# Patient Record
Sex: FEMALE | Race: BLACK OR AFRICAN AMERICAN | NOT HISPANIC OR LATINO
[De-identification: names, ages, dates, MRNs, and addresses within clinical notes are randomized per-mention and may not be internally consistent; named-entity substitution may affect disease eponyms.]

---

## 2023-08-14 ENCOUNTER — NON-APPOINTMENT (OUTPATIENT)
Age: 28
End: 2023-08-14

## 2023-08-14 ENCOUNTER — APPOINTMENT (OUTPATIENT)
Dept: OBGYN | Facility: CLINIC | Age: 28
End: 2023-08-14
Payer: COMMERCIAL

## 2023-08-14 VITALS
BODY MASS INDEX: 26.06 KG/M2 | WEIGHT: 138 LBS | SYSTOLIC BLOOD PRESSURE: 110 MMHG | DIASTOLIC BLOOD PRESSURE: 62 MMHG | HEIGHT: 61 IN | HEART RATE: 59 BPM

## 2023-08-14 DIAGNOSIS — Z01.419 ENCOUNTER FOR GYNECOLOGICAL EXAMINATION (GENERAL) (ROUTINE) W/OUT ABNORMAL FINDINGS: ICD-10-CM

## 2023-08-14 DIAGNOSIS — Z78.9 OTHER SPECIFIED HEALTH STATUS: ICD-10-CM

## 2023-08-14 DIAGNOSIS — N89.8 OTHER SPECIFIED NONINFLAMMATORY DISORDERS OF VAGINA: ICD-10-CM

## 2023-08-14 DIAGNOSIS — N85.4 MALPOSITION OF UTERUS: ICD-10-CM

## 2023-08-14 PROBLEM — Z00.00 ENCOUNTER FOR PREVENTIVE HEALTH EXAMINATION: Status: ACTIVE | Noted: 2023-08-14

## 2023-08-14 PROCEDURE — 76830 TRANSVAGINAL US NON-OB: CPT

## 2023-08-14 PROCEDURE — 99385 PREV VISIT NEW AGE 18-39: CPT | Mod: 25

## 2023-08-14 NOTE — REVIEW OF SYSTEMS
[Abn Vaginal bleeding] : abnormal vaginal bleeding [Negative] : Gastrointestinal [Pelvic pain] : no pelvic pain

## 2023-08-14 NOTE — PHYSICAL EXAM
[Chaperone Present] : A chaperone was present in the examining room during all aspects of the physical examination [Labia Majora] : normal [Labia Minora] : normal [Normal] : normal [Retroversion] : retroverted [Uterine Adnexae] : normal

## 2023-08-14 NOTE — HISTORY OF PRESENT ILLNESS
[Menses] : menses [Abnormal Quantity] : abnormal quantity [Abnormal Duration] : abnormal duration [Heavy Bleeding] : heavy bleeding [Pain] : pain [Currently Active] : currently active [Women] : women [Oral] : oral [No] : No [FreeTextEntry1] : here for her annual severe dysmenorrhea....ibuprofen not working Heavy menses with clots    	Print Patient Name	SYDNEY HECK (24yo, F) ID# [id number]	Appt. Date/Time	2020 02:00PM 	1995	Service Dept.	[facility] OFFICE Provider	SAKINA CHAVES MD Insurance	 Med Primary: I Insurance # : [id number] Policy/Group # : [id number] Prescription: [employer] - Member is eligible. details Prescription: [employer] - Member is eligible. details Prescription: [employer] - Member is eligible. details Chief Complaint annual gyn exam, new patient Patient's Care Team Primary Care Provider: JAIME POLLACK MD: [address] [place] [address] "[place] [id number], Ph [phone], Fax [phone] NPI: [id number] Patient's Pharmacies [address] ([facility]X): [address] [place] [id number], Ph [phone], Fax [phone] Vitals 2020 02:51 pm Ht:	5 ft Wt:	Not Performed - Patient refused T:	97.7 F Allergies Reviewed Allergies PENICILLINS	 Medications No medications reported Problems Reviewed Problems No known problems Family History Discussed Family History Father	- No current problems or disability Mother	- No current problems or disability Social History Discussed Social History 2019 novel coronavirus (2019-nCoV) Has patient visited an area known to be high risk for 2019 n-CoV?: N In the 14 days before symptom onset, did the patient spend time in [facility]?: N Tobacco Smoking Status: Never smoker Non-smoker Smokeless Tobacco Status: Never used smokeless tobacco Tobacco-years of use: 0 E-cigarette/Vape Status: Never used electronic cigarettes Most Recent Tobacco Use Screenin2020 Surgical History Reviewed Surgical History GYN History Reviewed GYN History Duration of Flow (days): 5. Menses Monthly: Y. Flow: Moderate. Date of LMP: 2020. Obstetric History Reviewed Obstetric History Past Medical History Discussed Past Medical History Screening None recorded. HPI 25 year old here for annual exam, sexually active with female currently, has been with males in the past, desires to conceive with insemination.  Cycles regular in frequency and duration, denies excessive bleeding, admits to severe cramping.  ROS Patient reports no fatigue, no fever, no significant weight gain, and no significant weight loss. She reports no dyspnea / shortness of breath, no cough, and no wheezing. She reports no chest pain and no palpitations. She reports no nausea, no vomiting, no abdominal pain, no bowel movement changes, no diarrhea, no constipation, and no rectal bleeding. She reports no abnormal bleeding, no trouble urinating, no incontinence, no rash, no lesion, no discharge, no vaginal odor, and no vaginal itching. She reports no menstrual problems and no PMDD symptoms. She reports no headaches, no dizziness, and no weakness. Physical Exam Patient is a 25-year-old female.  Chaperone: Chaperone: present.  Constitutional: General Appearance: healthy-appearing, well-nourished, and well-developed.  Psychiatric: Mood and Affect: normal mood and affect and active and alert.  Skin: Appearance: no rashes or lesions.  Lungs: Respiratory Effort: no intercostal retractions or accessory muscle usage.  Cardiovascular: Peripheral Vascular: no LLE edema or RLE edema.  Abdomen: Auscultation/Inspection/Palpation: soft, non-distended, and no tenderness. Hernia: none palpated.  Female Genitalia: Vulva: no masses, atrophy, or lesions. Vagina: no tenderness, erythema, cystocele, rectocele, abnormal vaginal discharge, or vesicle(s) or ulcers. Cervix: no discharge or cervical motion tenderness and grossly normal and sample taken for a Pap smear. Uterus: normal size and shape and midline, mobile, non-tender, and no uterine prolapse. Bladder/Urethra: no urethral discharge or mass and normal meatus and bladder non distended. Adnexa/Parametria: no parametrial tenderness or mass and no adnexal tenderness or ovarian mass.  Lymph Nodes: Palpation: non tender inguinal nodes. Assessment / Plan 1. Gynecologic examination Z01.419: Encounter for gynecological examination (general) (routine) without abnormal findings PAP, IG + HPV, CERVICAL CHLAMYDIA TRACHOMATIS + NEISSERIA GONORRHEA RRNA, QL, GENITAL URINALYSIS, DIPSTICK  2. Dysmenorrhea - TVUS unremarkable, will continue to monitor, will consider hormonal method for cycle regulartion  N94.6: Dysmenorrhea, unspecified US, TRANSVAGINAL  US, TRANSVAGINAL  Review of us, transvaginal taken on 2020 at [facility] OFFICE shows: Imaging Studies: Indications: pelvic pain. Uterus: volume (cc): 58. Endometrium: thickness 14 mm. Cervix: normal. Cul de sac: no fluid was demonstrated. Right Ovary: volume (cc): 4.7. Left Ovary: volume (cc): 6.  Return to Office None recorded. Encounter Sign-Off Encounter signed-off by Sakina Chaves MD, 2020. Encounter performed and documented by Sakina Chaves MD Encounter reviewed & signed by Sakina Chaves MD on 2020 at 3:40pm  previous [place] visit:

## 2023-08-14 NOTE — PROCEDURE
[Transvaginal Ultrasound] : transvaginal ultrasound [FreeTextEntry3] : cervix normal third degree retroversion no fluid cervix Normal [FreeTextEntry5] : 38 cc vol  10 mm [FreeTextEntry7] : 8.4 cc [FreeTextEntry8] : 2.2 cc

## 2023-08-17 LAB
C TRACH RRNA SPEC QL NAA+PROBE: NOT DETECTED
CYTOLOGY CVX/VAG DOC THIN PREP: NORMAL
HPV HIGH+LOW RISK DNA PNL CVX: NOT DETECTED
N GONORRHOEA RRNA SPEC QL NAA+PROBE: NOT DETECTED
SOURCE AMPLIFICATION: NORMAL

## 2023-08-19 DIAGNOSIS — B37.9 CANDIDIASIS, UNSPECIFIED: ICD-10-CM

## 2023-08-19 LAB
A VAGINAE DNA VAG QL NAA+PROBE: NORMAL
BVAB2 DNA VAG QL NAA+PROBE: NORMAL
C KRUSEI DNA VAG QL NAA+PROBE: NEGATIVE
C KRUSEI DNA VAG QL NAA+PROBE: POSITIVE
C TRACH RRNA SPEC QL NAA+PROBE: NEGATIVE
CANDIDA DNA VAG QL NAA+PROBE: NEGATIVE
MEGA1 DNA VAG QL NAA+PROBE: NORMAL
N GONORRHOEA RRNA SPEC QL NAA+PROBE: NEGATIVE
T VAGINALIS RRNA SPEC QL NAA+PROBE: NEGATIVE

## 2023-08-21 ENCOUNTER — NON-APPOINTMENT (OUTPATIENT)
Age: 28
End: 2023-08-21

## 2024-04-01 ENCOUNTER — APPOINTMENT (OUTPATIENT)
Dept: OBGYN | Facility: CLINIC | Age: 29
End: 2024-04-01
Payer: MEDICAID

## 2024-04-01 VITALS
WEIGHT: 146 LBS | BODY MASS INDEX: 27.56 KG/M2 | SYSTOLIC BLOOD PRESSURE: 131 MMHG | DIASTOLIC BLOOD PRESSURE: 88 MMHG | HEART RATE: 75 BPM | HEIGHT: 61 IN

## 2024-04-01 DIAGNOSIS — B00.9 DISORDER OF GINGIVA AND EDENTULOUS ALVEOLAR RIDGE, UNSPECIFIED: ICD-10-CM

## 2024-04-01 DIAGNOSIS — L29.2 PRURITUS VULVAE: ICD-10-CM

## 2024-04-01 DIAGNOSIS — K06.9 DISORDER OF GINGIVA AND EDENTULOUS ALVEOLAR RIDGE, UNSPECIFIED: ICD-10-CM

## 2024-04-01 DIAGNOSIS — N93.9 ABNORMAL UTERINE AND VAGINAL BLEEDING, UNSPECIFIED: ICD-10-CM

## 2024-04-01 DIAGNOSIS — N94.6 DYSMENORRHEA, UNSPECIFIED: ICD-10-CM

## 2024-04-01 PROCEDURE — 99459 PELVIC EXAMINATION: CPT | Mod: 25

## 2024-04-01 PROCEDURE — 99214 OFFICE O/P EST MOD 30 MIN: CPT | Mod: 25

## 2024-04-01 PROCEDURE — 76830 TRANSVAGINAL US NON-OB: CPT

## 2024-04-01 RX ORDER — FLUCONAZOLE 200 MG/1
200 TABLET ORAL
Qty: 3 | Refills: 1 | Status: ACTIVE | OUTPATIENT
Start: 2023-08-19

## 2024-04-01 RX ORDER — VALACYCLOVIR 1 G/1
1 TABLET, FILM COATED ORAL
Qty: 6 | Refills: 3 | Status: ACTIVE | OUTPATIENT
Start: 2023-08-14

## 2024-04-01 RX ORDER — CLOTRIMAZOLE AND BETAMETHASONE DIPROPIONATE 10; .5 MG/G; MG/G
1-0.05 CREAM TOPICAL TWICE DAILY
Qty: 1 | Refills: 3 | Status: ACTIVE | COMMUNITY
Start: 2024-04-01 | End: 1900-01-01

## 2024-04-01 RX ORDER — NAPROXEN SODIUM 550 MG/1
550 TABLET ORAL
Qty: 30 | Refills: 1 | Status: ACTIVE | OUTPATIENT
Start: 2023-08-14

## 2024-04-01 NOTE — PHYSICAL EXAM
[Chaperone Present] : A chaperone was present in the examining room during all aspects of the physical examination [Labia Majora] : normal [Labia Minora] : normal [Normal] : normal [Retroversion] : retroverted [Uterine Adnexae] : normal [FreeTextEntry1] : ERYTHEMA AND WHITE PLAQUES AT HER INTROITUS

## 2024-04-01 NOTE — HISTORY OF PRESENT ILLNESS
[Abnormal Quantity] : abnormal quantity [Abnormal Duration] : abnormal duration [Heavy Bleeding] : heavy bleeding [Pain] : pain [FreeTextEntry1] : HERE FOR A FU OF HER DYSMENORRHEA AND ALSO WITH VAULVOVAGINAL DYSCOMFORT

## 2024-04-01 NOTE — PROCEDURE
[Abnormal Uterine Bleeding] : abnormal uterine bleeding [Transvaginal Ultrasound] : transvaginal ultrasound [FreeTextEntry3] : cervic normal no free fluid suspected small polyp, endometrial will need outside sono for clarification , as she is considering IVF, so if indeed a polyp, will need removal prior to cycling her [FreeTextEntry5] : 39 cc vol,  endometrium 5 mm...trace circ polyp; 2.08 cm [FreeTextEntry7] : 4.6 cc [FreeTextEntry8] : 5.5 cc

## 2024-04-15 ENCOUNTER — TRANSCRIPTION ENCOUNTER (OUTPATIENT)
Age: 29
End: 2024-04-15

## 2024-12-09 ENCOUNTER — APPOINTMENT (OUTPATIENT)
Dept: OBGYN | Facility: CLINIC | Age: 29
End: 2024-12-09

## 2024-12-11 ENCOUNTER — NON-APPOINTMENT (OUTPATIENT)
Age: 29
End: 2024-12-11

## 2024-12-13 ENCOUNTER — NON-APPOINTMENT (OUTPATIENT)
Age: 29
End: 2024-12-13